# Patient Record
Sex: MALE | Race: WHITE | NOT HISPANIC OR LATINO | Employment: FULL TIME | URBAN - METROPOLITAN AREA
[De-identification: names, ages, dates, MRNs, and addresses within clinical notes are randomized per-mention and may not be internally consistent; named-entity substitution may affect disease eponyms.]

---

## 2019-01-04 ENCOUNTER — WALK IN (OUTPATIENT)
Dept: URGENT CARE | Age: 29
End: 2019-01-04

## 2019-01-04 DIAGNOSIS — S61.211A LACERATION OF LEFT INDEX FINGER WITHOUT FOREIGN BODY WITHOUT DAMAGE TO NAIL, INITIAL ENCOUNTER: Primary | ICD-10-CM

## 2019-01-04 PROCEDURE — 90471 IMMUNIZATION ADMIN: CPT | Performed by: NURSE PRACTITIONER

## 2019-01-04 PROCEDURE — 12001 RPR S/N/AX/GEN/TRNK 2.5CM/<: CPT | Performed by: NURSE PRACTITIONER

## 2019-01-04 PROCEDURE — 90715 TDAP VACCINE 7 YRS/> IM: CPT | Performed by: NURSE PRACTITIONER

## 2019-01-04 PROCEDURE — 99214 OFFICE O/P EST MOD 30 MIN: CPT | Performed by: NURSE PRACTITIONER

## 2019-01-04 ASSESSMENT — ENCOUNTER SYMPTOMS
WEAKNESS: 0
WOUND: 1
NUMBNESS: 0
CHILLS: 0
TREMORS: 0
FEVER: 0

## 2019-01-04 ASSESSMENT — PAIN SCALES - GENERAL: PAINLEVEL: 3-4

## 2019-01-15 ENCOUNTER — WALK IN (OUTPATIENT)
Dept: URGENT CARE | Age: 29
End: 2019-01-15

## 2019-01-15 DIAGNOSIS — S61.211D LACERATION OF LEFT INDEX FINGER WITHOUT FOREIGN BODY WITHOUT DAMAGE TO NAIL, SUBSEQUENT ENCOUNTER: ICD-10-CM

## 2019-01-15 DIAGNOSIS — Z48.02 VISIT FOR SUTURE REMOVAL: Primary | ICD-10-CM

## 2019-01-15 PROCEDURE — 99201 OFFICE/OUTPT VISIT,NEW,LEVL I: CPT | Performed by: FAMILY MEDICINE

## 2019-01-15 ASSESSMENT — PAIN SCALES - GENERAL: PAINLEVEL: 0

## 2019-05-31 RX ORDER — MULTIVIT-MIN/IRON FUM/FOLIC AC 7.5 MG-4
TABLET ORAL
COMMUNITY

## 2019-06-03 ENCOUNTER — OFFICE VISIT (OUTPATIENT)
Dept: INTERNAL MEDICINE | Age: 29
End: 2019-06-03

## 2019-06-03 ENCOUNTER — TELEPHONE (OUTPATIENT)
Dept: OTHER | Age: 29
End: 2019-06-03

## 2019-06-03 VITALS
DIASTOLIC BLOOD PRESSURE: 78 MMHG | WEIGHT: 315 LBS | SYSTOLIC BLOOD PRESSURE: 130 MMHG | BODY MASS INDEX: 36.45 KG/M2 | HEART RATE: 64 BPM | HEIGHT: 78 IN

## 2019-06-03 DIAGNOSIS — Z00.00 PHYSICAL EXAM, ANNUAL: Primary | ICD-10-CM

## 2019-06-03 DIAGNOSIS — I82.402 RECURRENT ACUTE DEEP VEIN THROMBOSIS (DVT) OF LEFT LOWER EXTREMITY (CMD): ICD-10-CM

## 2019-06-03 PROCEDURE — 99203 OFFICE O/P NEW LOW 30 MIN: CPT | Performed by: INTERNAL MEDICINE

## 2019-06-03 ASSESSMENT — PATIENT HEALTH QUESTIONNAIRE - PHQ9
2. FEELING DOWN, DEPRESSED OR HOPELESS: NOT AT ALL
1. LITTLE INTEREST OR PLEASURE IN DOING THINGS: NOT AT ALL
SUM OF ALL RESPONSES TO PHQ9 QUESTIONS 1 AND 2: 0
SUM OF ALL RESPONSES TO PHQ9 QUESTIONS 1 AND 2: 0

## 2019-06-26 ENCOUNTER — APPOINTMENT (OUTPATIENT)
Dept: HEMATOLOGY/ONCOLOGY | Age: 29
End: 2019-06-26
Attending: INTERNAL MEDICINE

## 2019-06-27 ENCOUNTER — OFFICE VISIT (OUTPATIENT)
Dept: HEMATOLOGY/ONCOLOGY | Age: 29
End: 2019-06-27
Attending: INTERNAL MEDICINE

## 2019-06-27 ENCOUNTER — LAB SERVICES (OUTPATIENT)
Dept: HEMATOLOGY/ONCOLOGY | Age: 29
End: 2019-06-27
Attending: INTERNAL MEDICINE

## 2019-06-27 DIAGNOSIS — Z00.00 PHYSICAL EXAM, ANNUAL: ICD-10-CM

## 2019-06-27 DIAGNOSIS — I82.402 RECURRENT ACUTE DEEP VEIN THROMBOSIS (DVT) OF LEFT LOWER EXTREMITY (CMD): ICD-10-CM

## 2019-06-27 PROBLEM — I82.409 RECURRENT DEEP VENOUS THROMBOSIS (CMD): Status: ACTIVE | Noted: 2019-06-27

## 2019-06-27 LAB
BASOPHILS # BLD AUTO: 0 K/MCL (ref 0–0.3)
BASOPHILS NFR BLD AUTO: 1 %
CHOLEST SERPL-MCNC: 174 MG/DL
CHOLEST/HDLC SERPL: 2.8 {RATIO}
DIFFERENTIAL METHOD BLD: NORMAL
EOSINOPHIL # BLD AUTO: 0.2 K/MCL (ref 0.1–0.5)
EOSINOPHIL NFR SPEC: 6 %
ERYTHROCYTE [DISTWIDTH] IN BLOOD: 13.8 % (ref 11–15)
HCT VFR BLD CALC: 44.2 % (ref 39–51)
HDLC SERPL-MCNC: 63 MG/DL
HGB BLD-MCNC: 15 G/DL (ref 13–17)
LDLC SERPL-MCNC: 102 MG/DL
LENGTH OF FAST TIME PATIENT: NORMAL HRS
LYMPHOCYTES # BLD MANUAL: 1.5 K/MCL (ref 1–4.8)
LYMPHOCYTES NFR BLD MANUAL: 34 %
MCH RBC QN AUTO: 28.9 PG (ref 26–34)
MCHC RBC AUTO-ENTMCNC: 33.9 G/DL (ref 32–36.5)
MCV RBC AUTO: 85.2 FL (ref 78–100)
MONOCYTES # BLD MANUAL: 0.4 K/MCL (ref 0.3–0.9)
MONOCYTES NFR BLD MANUAL: 9 %
NEUTROPHILS # BLD: 2.2 K/MCL (ref 1.8–7.7)
NEUTROPHILS NFR BLD AUTO: 50 %
NONHDLC SERPL-MCNC: 111 MG/DL
PLATELET # BLD: 197 K/MCL (ref 140–450)
RBC # BLD: 5.19 MIL/MCL (ref 4.5–5.9)
TRIGL SERPL-MCNC: 47 MG/DL
WBC # BLD: 4.3 K/MCL (ref 4.2–11)

## 2019-06-27 PROCEDURE — 83020 HEMOGLOBIN ELECTROPHORESIS: CPT

## 2019-06-27 PROCEDURE — 99245 OFF/OP CONSLTJ NEW/EST HI 55: CPT | Performed by: INTERNAL MEDICINE

## 2019-06-27 PROCEDURE — 85300 ANTITHROMBIN III ACTIVITY: CPT

## 2019-06-27 PROCEDURE — 88185 FLOWCYTOMETRY/TC ADD-ON: CPT

## 2019-06-27 PROCEDURE — 85025 COMPLETE CBC W/AUTO DIFF WBC: CPT

## 2019-06-27 PROCEDURE — 85240 CLOT FACTOR VIII AHG 1 STAGE: CPT

## 2019-06-27 PROCEDURE — 36415 COLL VENOUS BLD VENIPUNCTURE: CPT

## 2019-06-27 PROCEDURE — 83036 HEMOGLOBIN GLYCOSYLATED A1C: CPT

## 2019-06-27 PROCEDURE — 85379 FIBRIN DEGRADATION QUANT: CPT

## 2019-06-27 PROCEDURE — 80061 LIPID PANEL: CPT

## 2019-06-27 ASSESSMENT — PAIN SCALES - GENERAL: PAINLEVEL: 0

## 2019-06-28 LAB
AT III ACT/NOR PPP CHRO: 96 % (ref 80–124)
D DIMER PPP FEU-MCNC: 0.23 MG/L (FEU)
FACT VIII ACT/NOR PPP: 159 % (ref 50–150)
HBA1C MFR BLD: 4.8 % (ref 4.5–5.6)
HGB A1 MFR BLD ELPH: 97.3 % (ref 96.4–98.2)
HGB A2 MFR BLD ELPH: 2.7 % (ref 1.8–3.4)
HGB C MFR BLD ELPH: NORMAL %
HGB F MFR BLD ELPH: NORMAL % (ref 0–2)
HGB FRACT BLD ELPH-IMP: NORMAL
HGB OTHER MFR BLD ELPH: NORMAL %
HGB S MFR BLD ELPH: NORMAL %
PATHOLOGIST NAME: NORMAL

## 2019-07-01 ENCOUNTER — TELEPHONE (OUTPATIENT)
Dept: INTERNAL MEDICINE | Age: 29
End: 2019-07-01

## 2019-07-02 LAB — PAROXYSMAL NOCTURNAL HEMOGLOBINURIA (PNHMKR): NORMAL

## 2019-08-02 ENCOUNTER — TELEPHONE (OUTPATIENT)
Dept: HEMATOLOGY/ONCOLOGY | Age: 29
End: 2019-08-02

## 2019-08-15 ENCOUNTER — OFFICE VISIT (OUTPATIENT)
Dept: HEMATOLOGY/ONCOLOGY | Age: 29
End: 2019-08-15
Attending: INTERNAL MEDICINE

## 2019-08-15 DIAGNOSIS — I82.409 RECURRENT DEEP VENOUS THROMBOSIS (CMD): ICD-10-CM

## 2019-08-15 PROCEDURE — 99214 OFFICE O/P EST MOD 30 MIN: CPT | Performed by: INTERNAL MEDICINE

## 2019-08-15 ASSESSMENT — PAIN SCALES - GENERAL: PAINLEVEL: 0

## 2019-08-28 PROBLEM — D68.51 FACTOR 5 LEIDEN MUTATION, HETEROZYGOUS (CMD): Status: ACTIVE | Noted: 2019-05-03

## 2019-12-03 ENCOUNTER — APPOINTMENT (OUTPATIENT)
Dept: INTERNAL MEDICINE | Age: 29
End: 2019-12-03

## 2020-02-13 ENCOUNTER — OFFICE VISIT (OUTPATIENT)
Dept: HEMATOLOGY/ONCOLOGY | Age: 30
End: 2020-02-13
Attending: INTERNAL MEDICINE

## 2020-02-13 ENCOUNTER — LAB SERVICES (OUTPATIENT)
Dept: LAB | Age: 30
End: 2020-02-13
Attending: INTERNAL MEDICINE

## 2020-02-13 DIAGNOSIS — R79.1 ELEVATED FACTOR VIII LEVEL: ICD-10-CM

## 2020-02-13 DIAGNOSIS — I82.409 RECURRENT DEEP VENOUS THROMBOSIS (CMD): Primary | ICD-10-CM

## 2020-02-13 DIAGNOSIS — I82.402 RECURRENT ACUTE DEEP VEIN THROMBOSIS (DVT) OF LEFT LOWER EXTREMITY (CMD): ICD-10-CM

## 2020-02-13 DIAGNOSIS — I82.409 RECURRENT DEEP VENOUS THROMBOSIS (CMD): ICD-10-CM

## 2020-02-13 LAB — FACT VIII ACT/NOR PPP: 191 % (ref 50–150)

## 2020-02-13 PROCEDURE — 36415 COLL VENOUS BLD VENIPUNCTURE: CPT

## 2020-02-13 PROCEDURE — 99214 OFFICE O/P EST MOD 30 MIN: CPT | Performed by: INTERNAL MEDICINE

## 2020-02-13 PROCEDURE — 85240 CLOT FACTOR VIII AHG 1 STAGE: CPT

## 2020-02-13 ASSESSMENT — PAIN SCALES - GENERAL: PAINLEVEL: 0

## 2020-07-22 ENCOUNTER — TELEPHONE (OUTPATIENT)
Dept: HEMATOLOGY/ONCOLOGY | Age: 30
End: 2020-07-22

## 2020-07-22 DIAGNOSIS — I82.402 RECURRENT ACUTE DEEP VEIN THROMBOSIS (DVT) OF LEFT LOWER EXTREMITY (CMD): ICD-10-CM

## 2020-09-11 ENCOUNTER — OFFICE VISIT (OUTPATIENT)
Dept: CHIROPRACTIC MEDICINE | Age: 30
End: 2020-09-11

## 2020-09-11 DIAGNOSIS — M99.05 PELVIC REGION SOMATIC DYSFUNCTION: ICD-10-CM

## 2020-09-11 DIAGNOSIS — M99.03 LUMBAR REGION SOMATIC DYSFUNCTION: ICD-10-CM

## 2020-09-11 DIAGNOSIS — M62.838 MUSCLE SPASM: ICD-10-CM

## 2020-09-11 DIAGNOSIS — M99.04 SACRAL REGION SOMATIC DYSFUNCTION: ICD-10-CM

## 2020-09-11 DIAGNOSIS — M54.50 ACUTE BILATERAL LOW BACK PAIN WITHOUT SCIATICA: Primary | ICD-10-CM

## 2020-09-11 PROCEDURE — 99202 OFFICE O/P NEW SF 15 MIN: CPT | Performed by: CHIROPRACTOR

## 2020-09-11 PROCEDURE — 98941 CHIROPRACT MANJ 3-4 REGIONS: CPT | Performed by: CHIROPRACTOR

## 2020-09-18 ENCOUNTER — OFFICE VISIT (OUTPATIENT)
Dept: INTERNAL MEDICINE | Age: 30
End: 2020-09-18

## 2020-09-18 VITALS
BODY MASS INDEX: 36.13 KG/M2 | SYSTOLIC BLOOD PRESSURE: 132 MMHG | DIASTOLIC BLOOD PRESSURE: 68 MMHG | WEIGHT: 315 LBS | HEART RATE: 68 BPM

## 2020-09-18 DIAGNOSIS — M54.9 BACK PAIN WITHOUT RADIATION: Primary | ICD-10-CM

## 2020-09-18 DIAGNOSIS — Z23 NEED FOR VACCINATION: ICD-10-CM

## 2020-09-18 PROCEDURE — 99213 OFFICE O/P EST LOW 20 MIN: CPT | Performed by: INTERNAL MEDICINE

## 2020-09-18 PROCEDURE — 90686 IIV4 VACC NO PRSV 0.5 ML IM: CPT | Performed by: INTERNAL MEDICINE

## 2020-09-18 PROCEDURE — 90471 IMMUNIZATION ADMIN: CPT | Performed by: INTERNAL MEDICINE

## 2020-09-18 RX ORDER — TRAMADOL HYDROCHLORIDE 50 MG/1
50 TABLET ORAL EVERY 6 HOURS PRN
Qty: 20 TABLET | Refills: 0 | Status: SHIPPED | OUTPATIENT
Start: 2020-09-18

## 2020-09-21 ENCOUNTER — OFFICE VISIT (OUTPATIENT)
Dept: REHABILITATION | Age: 30
End: 2020-09-21
Attending: INTERNAL MEDICINE

## 2020-09-21 DIAGNOSIS — M54.9 BACK PAIN WITHOUT RADIATION: ICD-10-CM

## 2020-09-21 PROCEDURE — 97110 THERAPEUTIC EXERCISES: CPT | Performed by: PHYSICAL THERAPIST

## 2020-09-21 PROCEDURE — 97161 PT EVAL LOW COMPLEX 20 MIN: CPT | Performed by: PHYSICAL THERAPIST

## 2020-09-21 ASSESSMENT — ENCOUNTER SYMPTOMS
SUBJECTIVE PAIN PROGRESSION: IMPROVED
QUALITY: STIFF
ALLEVIATING FACTORS: REST
QUALITY: ACHE
PAIN SCALE AT LOWEST: 2
PAIN SEVERITY NOW: 4
PAIN SCALE AT HIGHEST: 8

## 2020-09-24 ENCOUNTER — OFFICE VISIT (OUTPATIENT)
Dept: REHABILITATION | Age: 30
End: 2020-09-24

## 2020-09-24 DIAGNOSIS — M54.9 BACK PAIN WITHOUT RADIATION: Primary | ICD-10-CM

## 2020-09-24 PROCEDURE — 97110 THERAPEUTIC EXERCISES: CPT | Performed by: PHYSICAL THERAPIST

## 2020-09-24 ASSESSMENT — ENCOUNTER SYMPTOMS
PAIN SCALE AT HIGHEST: 8
PAIN SCALE AT LOWEST: 2

## 2020-09-28 ENCOUNTER — OFFICE VISIT (OUTPATIENT)
Dept: REHABILITATION | Age: 30
End: 2020-09-28

## 2020-09-28 DIAGNOSIS — M54.9 BACK PAIN WITHOUT RADIATION: Primary | ICD-10-CM

## 2020-09-28 PROCEDURE — 97110 THERAPEUTIC EXERCISES: CPT | Performed by: PHYSICAL THERAPIST

## 2020-09-28 ASSESSMENT — ENCOUNTER SYMPTOMS
QUALITY: STIFF
PAIN SEVERITY NOW: 1

## 2020-10-08 ENCOUNTER — OFFICE VISIT (OUTPATIENT)
Dept: REHABILITATION | Age: 30
End: 2020-10-08

## 2020-10-08 DIAGNOSIS — M54.9 BACK PAIN WITHOUT RADIATION: Primary | ICD-10-CM

## 2020-10-08 PROCEDURE — 97110 THERAPEUTIC EXERCISES: CPT | Performed by: PHYSICAL THERAPIST

## 2020-10-08 ASSESSMENT — ENCOUNTER SYMPTOMS
QUALITY: STIFF
PAIN SEVERITY NOW: 1

## 2021-01-11 ENCOUNTER — TELEPHONE (OUTPATIENT)
Dept: HEMATOLOGY/ONCOLOGY | Age: 31
End: 2021-01-11

## 2021-04-06 VITALS
SYSTOLIC BLOOD PRESSURE: 134 MMHG | SYSTOLIC BLOOD PRESSURE: 118 MMHG | SYSTOLIC BLOOD PRESSURE: 126 MMHG | HEART RATE: 65 BPM | SYSTOLIC BLOOD PRESSURE: 150 MMHG | HEART RATE: 68 BPM | DIASTOLIC BLOOD PRESSURE: 70 MMHG | WEIGHT: 315 LBS | TEMPERATURE: 98 F | DIASTOLIC BLOOD PRESSURE: 84 MMHG | OXYGEN SATURATION: 99 % | TEMPERATURE: 96.9 F | TEMPERATURE: 97.7 F | OXYGEN SATURATION: 97 % | DIASTOLIC BLOOD PRESSURE: 88 MMHG | DIASTOLIC BLOOD PRESSURE: 70 MMHG | TEMPERATURE: 97.9 F | HEIGHT: 78 IN | OXYGEN SATURATION: 99 % | DIASTOLIC BLOOD PRESSURE: 84 MMHG | RESPIRATION RATE: 18 BRPM | HEART RATE: 69 BPM | HEART RATE: 65 BPM | BODY MASS INDEX: 36.45 KG/M2 | HEART RATE: 61 BPM | OXYGEN SATURATION: 96 % | RESPIRATION RATE: 20 BRPM | TEMPERATURE: 97.7 F | OXYGEN SATURATION: 96 % | WEIGHT: 315 LBS | SYSTOLIC BLOOD PRESSURE: 122 MMHG | HEIGHT: 78 IN | BODY MASS INDEX: 36.31 KG/M2 | WEIGHT: 315 LBS | BODY MASS INDEX: 36.45 KG/M2 | WEIGHT: 315 LBS | WEIGHT: 315 LBS | BODY MASS INDEX: 36.12 KG/M2 | HEIGHT: 78 IN | BODY MASS INDEX: 36.45 KG/M2

## 2021-07-23 ENCOUNTER — TELEPHONE (OUTPATIENT)
Dept: HEMATOLOGY/ONCOLOGY | Age: 31
End: 2021-07-23

## 2021-07-23 DIAGNOSIS — I82.402 RECURRENT ACUTE DEEP VEIN THROMBOSIS (DVT) OF LEFT LOWER EXTREMITY (CMD): ICD-10-CM

## 2021-10-03 DIAGNOSIS — I82.402 RECURRENT ACUTE DEEP VEIN THROMBOSIS (DVT) OF LEFT LOWER EXTREMITY (CMD): ICD-10-CM

## 2021-10-05 RX ORDER — RIVAROXABAN 20 MG/1
TABLET, FILM COATED ORAL
Qty: 90 TABLET | Refills: 3 | Status: SHIPPED | OUTPATIENT
Start: 2021-10-05

## 2022-10-26 ENCOUNTER — OFFICE VISIT (OUTPATIENT)
Dept: URBAN - METROPOLITAN AREA CLINIC 33 | Facility: CLINIC | Age: 32
End: 2022-10-26
Payer: COMMERCIAL

## 2022-10-26 DIAGNOSIS — H53.143 BILATERAL ASTHENOPIA: Primary | ICD-10-CM

## 2022-10-26 PROCEDURE — 92002 INTRM OPH EXAM NEW PATIENT: CPT | Performed by: OPTOMETRIST

## 2022-10-26 ASSESSMENT — INTRAOCULAR PRESSURE
OD: 11
OS: 11

## 2022-10-26 ASSESSMENT — KERATOMETRY
OD: 42.88
OS: 43.13

## 2022-10-26 NOTE — IMPRESSION/PLAN
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 Plan: Discussed condition with patient. Patient reports using the computer for most of the working day and eyes become tired toward the end of the day. Recommend breaks at the computer and use of AT's as needed.